# Patient Record
Sex: MALE | NOT HISPANIC OR LATINO | Employment: UNEMPLOYED | URBAN - METROPOLITAN AREA
[De-identification: names, ages, dates, MRNs, and addresses within clinical notes are randomized per-mention and may not be internally consistent; named-entity substitution may affect disease eponyms.]

---

## 2021-12-27 ENCOUNTER — OFFICE VISIT (OUTPATIENT)
Dept: INTERNAL MEDICINE | Facility: CLINIC | Age: 36
End: 2021-12-27
Payer: COMMERCIAL

## 2021-12-27 VITALS
WEIGHT: 286.2 LBS | OXYGEN SATURATION: 99 % | BODY MASS INDEX: 33.79 KG/M2 | HEIGHT: 77 IN | TEMPERATURE: 97.9 F | HEART RATE: 87 BPM | SYSTOLIC BLOOD PRESSURE: 124 MMHG | DIASTOLIC BLOOD PRESSURE: 76 MMHG

## 2021-12-27 DIAGNOSIS — F41.9 RECURRENT MODERATE MAJOR DEPRESSIVE DISORDER WITH ANXIETY (H): ICD-10-CM

## 2021-12-27 DIAGNOSIS — R00.2 PALPITATIONS: ICD-10-CM

## 2021-12-27 DIAGNOSIS — E78.5 HYPERLIPIDEMIA LDL GOAL <100: ICD-10-CM

## 2021-12-27 DIAGNOSIS — M72.2 PLANTAR FASCIITIS: ICD-10-CM

## 2021-12-27 DIAGNOSIS — G47.33 OSA (OBSTRUCTIVE SLEEP APNEA): ICD-10-CM

## 2021-12-27 DIAGNOSIS — Z00.00 ROUTINE GENERAL MEDICAL EXAMINATION AT A HEALTH CARE FACILITY: Primary | ICD-10-CM

## 2021-12-27 DIAGNOSIS — F33.1 RECURRENT MODERATE MAJOR DEPRESSIVE DISORDER WITH ANXIETY (H): ICD-10-CM

## 2021-12-27 DIAGNOSIS — Z71.85 VACCINE COUNSELING: ICD-10-CM

## 2021-12-27 LAB
ALBUMIN SERPL-MCNC: 4 G/DL (ref 3.4–5)
ALP SERPL-CCNC: 42 U/L (ref 40–150)
ALT SERPL W P-5'-P-CCNC: 108 U/L (ref 0–70)
ANION GAP SERPL CALCULATED.3IONS-SCNC: 7 MMOL/L (ref 3–14)
AST SERPL W P-5'-P-CCNC: 44 U/L (ref 0–45)
BILIRUB SERPL-MCNC: 0.3 MG/DL (ref 0.2–1.3)
BUN SERPL-MCNC: 14 MG/DL (ref 7–30)
CALCIUM SERPL-MCNC: 9 MG/DL (ref 8.5–10.1)
CHLORIDE BLD-SCNC: 108 MMOL/L (ref 94–109)
CHOLEST SERPL-MCNC: 195 MG/DL
CO2 SERPL-SCNC: 25 MMOL/L (ref 20–32)
CREAT SERPL-MCNC: 0.78 MG/DL (ref 0.66–1.25)
ERYTHROCYTE [DISTWIDTH] IN BLOOD BY AUTOMATED COUNT: 12.3 % (ref 10–15)
FASTING STATUS PATIENT QL REPORTED: YES
GFR SERPL CREATININE-BSD FRML MDRD: >90 ML/MIN/1.73M2
GLUCOSE BLD-MCNC: 101 MG/DL (ref 70–99)
HCT VFR BLD AUTO: 44.9 % (ref 40–53)
HDLC SERPL-MCNC: 39 MG/DL
HGB BLD-MCNC: 15.1 G/DL (ref 13.3–17.7)
LDLC SERPL CALC-MCNC: 128 MG/DL
MCH RBC QN AUTO: 30 PG (ref 26.5–33)
MCHC RBC AUTO-ENTMCNC: 33.6 G/DL (ref 31.5–36.5)
MCV RBC AUTO: 89 FL (ref 78–100)
NONHDLC SERPL-MCNC: 156 MG/DL
PLATELET # BLD AUTO: 228 10E3/UL (ref 150–450)
POTASSIUM BLD-SCNC: 4.2 MMOL/L (ref 3.4–5.3)
PROT SERPL-MCNC: 8 G/DL (ref 6.8–8.8)
RBC # BLD AUTO: 5.03 10E6/UL (ref 4.4–5.9)
SODIUM SERPL-SCNC: 140 MMOL/L (ref 133–144)
TRIGL SERPL-MCNC: 140 MG/DL
TSH SERPL DL<=0.005 MIU/L-ACNC: 1.68 MU/L (ref 0.4–4)
WBC # BLD AUTO: 5.9 10E3/UL (ref 4–11)

## 2021-12-27 PROCEDURE — 80053 COMPREHEN METABOLIC PANEL: CPT | Performed by: INTERNAL MEDICINE

## 2021-12-27 PROCEDURE — 96127 BRIEF EMOTIONAL/BEHAV ASSMT: CPT | Performed by: INTERNAL MEDICINE

## 2021-12-27 PROCEDURE — 99385 PREV VISIT NEW AGE 18-39: CPT | Performed by: INTERNAL MEDICINE

## 2021-12-27 PROCEDURE — 84443 ASSAY THYROID STIM HORMONE: CPT | Performed by: INTERNAL MEDICINE

## 2021-12-27 PROCEDURE — 85027 COMPLETE CBC AUTOMATED: CPT | Performed by: INTERNAL MEDICINE

## 2021-12-27 PROCEDURE — 80061 LIPID PANEL: CPT | Performed by: INTERNAL MEDICINE

## 2021-12-27 PROCEDURE — 99214 OFFICE O/P EST MOD 30 MIN: CPT | Mod: 25 | Performed by: INTERNAL MEDICINE

## 2021-12-27 PROCEDURE — 36415 COLL VENOUS BLD VENIPUNCTURE: CPT | Performed by: INTERNAL MEDICINE

## 2021-12-27 ASSESSMENT — ENCOUNTER SYMPTOMS
HEADACHES: 0
ARTHRALGIAS: 0
FREQUENCY: 0
PARESTHESIAS: 0
SORE THROAT: 0
FEVER: 0
NAUSEA: 0
CHILLS: 0
HEARTBURN: 0
NERVOUS/ANXIOUS: 1
WEAKNESS: 0
MYALGIAS: 0
DYSURIA: 0
SHORTNESS OF BREATH: 0
HEMATOCHEZIA: 0
CONSTIPATION: 0
EYE PAIN: 0
DIARRHEA: 0
COUGH: 0
HEMATURIA: 0
ABDOMINAL PAIN: 0
PALPITATIONS: 1
JOINT SWELLING: 0
DIZZINESS: 0

## 2021-12-27 ASSESSMENT — MIFFLIN-ST. JEOR: SCORE: 2345.57

## 2021-12-27 ASSESSMENT — PATIENT HEALTH QUESTIONNAIRE - PHQ9
10. IF YOU CHECKED OFF ANY PROBLEMS, HOW DIFFICULT HAVE THESE PROBLEMS MADE IT FOR YOU TO DO YOUR WORK, TAKE CARE OF THINGS AT HOME, OR GET ALONG WITH OTHER PEOPLE: VERY DIFFICULT
SUM OF ALL RESPONSES TO PHQ QUESTIONS 1-9: 17
SUM OF ALL RESPONSES TO PHQ QUESTIONS 1-9: 17

## 2021-12-27 NOTE — LETTER
[facility]  [address] [address]  Phone: [phone]   12/28/2021      Nathan Santiago  [address] [address]        Dear Mr. Nathan Santiago:    I am writing to inform you of the results of the laboratory tests you had done recently. Your blood counts and thyroid function are normal. Your comprehensive metabolic panel (which looks at your liver, kidneys, electrolytes, and glucose) is largely normal. Your ALT liver enzyme remains mildly elevated. Your cholesterol panel is elevated, but not to a level where I would recommend starting a medication to lower it at this point given your age and otherwise relative good health. I strongly recommend you obtain a COVID-19 vaccine as soon as possible.    Thank you for allowing me to participate in your care. If you have any further questions or problems, please contact me via our nurse line at [phone]. An even easier way to get ahold of myself or my team is through [facility], which you can sign up for at [email or web address]. [email or web address] is not only a great way to communicate with us, but also allows you to see your full results.      Sincerely,        Farhad Velázquez MD, MPH  Department of Internal Medicine  [facility]

## 2021-12-27 NOTE — PATIENT INSTRUCTIONS
- Our team will contact you via [employer] (if you sign up for it), telephone call (if results are urgent), or otherwise via letter in the mail with the results of today's lab tests once I have a chance to review them   - I recommend obtaining a COVID-19 vaccine as soon as possible

## 2021-12-27 NOTE — PROGRESS NOTES
SUBJECTIVE:   CC: Nathan Santiago is an 36 year old male who presents for preventative health visit.     Patient has been advised of split billing requirements and indicates understanding: Yes     Healthy Habits:     Getting at least 3 servings of Calcium per day:  Yes    Bi-annual eye exam:  Yes    Dental care twice a year:  NO    Sleep apnea or symptoms of sleep apnea:  Daytime drowsiness, Excessive snoring and Sleep apnea    Diet:  Regular (no restrictions)    Frequency of exercise:  4-5 days/week    Duration of exercise:  30-45 minutes    Taking medications regularly:  Not Applicable    Medication side effects:  Not applicable    PHQ-2 Total Score: 5    Additional concerns today:  Yes    Nathan presents today for a physical exam. This is the first time I have met Nathan. He has some chronic foot pain. He believes this could be plantar fasciitis. Happening a lot. Worst in the morning with first step. Pain at bottom of foot near his heel. Has not tried any insoles or NSAIDs. Believes he has sleep apnea and would like a referral to a sleep study. We also discussed his mood. He has noticed some anger and anxiety for the last few years. No SI, HI, or thoughts of self-harm. Some depression. Mostly anxiety. Reading a book about CBT with his wife. He's 'not a big pill person' but open to trying antidepressant.    Today's PHQ-2 Score:   PHQ-2 ( 1999 Pfizer) 12/27/2021   Q1: Little interest or pleasure in doing things 3   Q2: Feeling down, depressed or hopeless 2   PHQ-2 Score 5   Q1: Little interest or pleasure in doing things Nearly every day   Q2: Feeling down, depressed or hopeless More than half the days   PHQ-2 Score 5     Answers for HPI/ROS submitted by the patient on 12/27/2021  If you checked off any problems, how difficult have these problems made it for you to do your work, take care of things at home, or get along with other people?: Very difficult  PHQ9 TOTAL SCORE: 17    Abuse: Current or Past (Physical, Sexual or  "Emotional)- No  Do you feel safe in your environment? Yes    Social History     Tobacco Use     Smoking status: Former Smoker     Smokeless tobacco: Never Used   Substance Use Topics     Alcohol use: Not Currently     If you drink alcohol do you typically have >3 drinks per day or >7 drinks per week? No    Alcohol Use 12/27/2021   Prescreen: >3 drinks/day or >7 drinks/week? No   Prescreen: >3 drinks/day or >7 drinks/week? -     Reviewed orders with patient. Reviewed health maintenance and updated orders accordingly - Yes    Labs reviewed in EPIC    Reviewed and updated as needed this visit by clinical staff  Tobacco  Allergies  Meds  Problems  Med Hx  Surg Hx  Fam Hx  Soc Hx     Reviewed and updated as needed this visit by Provider  Tobacco  Allergies  Meds  Problems  Med Hx  Surg Hx  Fam Hx        Review of Systems   Constitutional: Negative for chills and fever.   HENT: Negative for congestion, ear pain, hearing loss and sore throat.    Eyes: Negative for pain and visual disturbance.   Respiratory: Negative for cough and shortness of breath.    Cardiovascular: Positive for palpitations. Negative for chest pain and peripheral edema.   Gastrointestinal: Negative for abdominal pain, constipation, diarrhea, heartburn, hematochezia and nausea.   Genitourinary: Negative for dysuria, frequency, genital sores, hematuria and urgency.   Musculoskeletal: Negative for arthralgias, joint swelling and myalgias.   Skin: Negative for rash.   Neurological: Negative for dizziness, weakness, headaches and paresthesias.   Psychiatric/Behavioral: Positive for mood changes. The patient is nervous/anxious.      OBJECTIVE:   /76   Pulse 87   Temp 97.9  F (36.6  C) (Tympanic)   Ht 1.956 m (6' 5\")   Wt 129.8 kg (286 lb 3.2 oz)   SpO2 99%   BMI 33.94 kg/m      Physical Exam  GENERAL: In no distress.  EYES: Conjunctivae/corneas clear. EOMs grossly intact. PERRL.  HENT: NC/AT, facies symmetric. Neck supple. No LAD or " thyromegaly noted.  RESP: CTAB. No w/r/r.  CV: RRR, no m/r/g.  GI: NT, ND, without rebound or guarding, no CVA tenderness, no hepatomegaly appreciated.  MSK: Moves all four extremities freely. Some TTP over plantar fascia areas bilaterally. No pain with 5th MTP palpation or foot squeeze bilaterally.  SKIN: No significant ulcers, lesions or rashes on the visualized portions of the skin  NEURO: Alert. Oriented.  PSYCH: Linear thought process. Speech normal rate and volume. No tangential thoughts, hallucinations, or delusions.    Diagnostic Test Results: Labs reviewed in [facility]    ASSESSMENT/PLAN:   Routine general medical examination at a health care facility  Reviewed PMH. Discussed healthcare maintenance issues, including cancer screenings (not due), relevant immunizations (declines COVID shot today), and cardiac risk factor screenings such as for cholesterol, HTN, and DM.    Recurrent moderate major depressive disorder with anxiety (H)  Interested in counseling. Also discussed starting selective serotonin reuptake inhibitor therapy, will start sertraline. Discussed side effects such as dry mouth, diarrhea, sexual dysfunction, and depersonalization. Discussed that these medications need to be taken every day to work and that they can take 4-6 weeks to fully kick in. F/u in 4-6 weeks for mood check.  - Adult Mental Health Referral; Future  - sertraline (ZOLOFT) 50 MG tablet; Take 0.5 tablets (25 mg) by mouth daily for 6 days, THEN 1 tablet (50 mg) daily.    ERNESTINE (obstructive sleep apnea)  High-risk STOP-BANG score. Referral placed.  - SLEEP EVALUATION & MANAGEMENT REFERRAL - ADULT -; Future    Plantar fasciitis  Describes classic history and TTP along plantar fascia on exam. Recommended good insoles, NSAIDs for breakthrough pain. Podiatry referral placed for use in future in case those two interventions do not help.  - Orthopedic  Referral; Future    Palpitations  Recent evaluation through OSH included EKG  "which was reportedly normal. DDX includes anxiety, though will screen for other possibilities with below blood work. Encouraged to monitor, discussed red flag cardiac symptoms. If persistent, could consider Holter/ZioPatch in future.  - Comprehensive metabolic panel; Future  - CBC with platelets; Future  - TSH with free T4 reflex; Future    Hyperlipidemia LDL goal <100  Fasting today.  - Lipid Profile; Future    Vaccine counseling  Unvaccinated against COVID-19. I offered the opportunity to ask any questions about the COVID-19 vaccines that he may have and he reported he does not have any questions.  I recommended he obtain one ASAP and he declined.    Patient has been advised of split billing requirements and indicates understanding: Yes     COUNSELING:   Special attention given to:        Regular exercise       Healthy diet/nutrition       Immunizations  Declined: COVID       Colon cancer screening       Prostate cancer screening    Estimated body mass index is 33.94 kg/m  as calculated from the following:    Height as of this encounter: 1.956 m (6' 5\").    Weight as of this encounter: 129.8 kg (286 lb 3.2 oz).     He reports that he has quit smoking. He has never used smokeless tobacco.    Farhad Velázquez MD  [facility]  "

## 2021-12-28 ASSESSMENT — PATIENT HEALTH QUESTIONNAIRE - PHQ9: SUM OF ALL RESPONSES TO PHQ QUESTIONS 1-9: 17

## 2022-02-06 ENCOUNTER — HEALTH MAINTENANCE LETTER (OUTPATIENT)
Age: 37
End: 2022-02-06

## 2022-02-22 ASSESSMENT — SLEEP AND FATIGUE QUESTIONNAIRES
HOW LIKELY ARE YOU TO NOD OFF OR FALL ASLEEP IN A CAR, WHILE STOPPED FOR A FEW MINUTES IN TRAFFIC: SLIGHT CHANCE OF DOZING
HOW LIKELY ARE YOU TO NOD OFF OR FALL ASLEEP WHILE SITTING INACTIVE IN A PUBLIC PLACE: MODERATE CHANCE OF DOZING
HOW LIKELY ARE YOU TO NOD OFF OR FALL ASLEEP WHILE LYING DOWN TO REST IN THE AFTERNOON WHEN CIRCUMSTANCES PERMIT: HIGH CHANCE OF DOZING
HOW LIKELY ARE YOU TO NOD OFF OR FALL ASLEEP WHILE SITTING QUIETLY AFTER LUNCH WITHOUT ALCOHOL: HIGH CHANCE OF DOZING
HOW LIKELY ARE YOU TO NOD OFF OR FALL ASLEEP WHILE SITTING AND TALKING TO SOMEONE: SLIGHT CHANCE OF DOZING
HOW LIKELY ARE YOU TO NOD OFF OR FALL ASLEEP WHILE WATCHING TV: MODERATE CHANCE OF DOZING
HOW LIKELY ARE YOU TO NOD OFF OR FALL ASLEEP WHILE SITTING AND READING: HIGH CHANCE OF DOZING
HOW LIKELY ARE YOU TO NOD OFF OR FALL ASLEEP WHEN YOU ARE A PASSENGER IN A CAR FOR AN HOUR WITHOUT A BREAK: MODERATE CHANCE OF DOZING

## 2022-02-28 ENCOUNTER — VIRTUAL VISIT (OUTPATIENT)
Dept: SLEEP MEDICINE | Facility: CLINIC | Age: 37
End: 2022-02-28
Attending: INTERNAL MEDICINE
Payer: COMMERCIAL

## 2022-02-28 VITALS — HEIGHT: 78 IN | WEIGHT: 270 LBS | BODY MASS INDEX: 31.24 KG/M2

## 2022-02-28 DIAGNOSIS — R06.83 SNORING: ICD-10-CM

## 2022-02-28 DIAGNOSIS — R40.0 SLEEPINESS: ICD-10-CM

## 2022-02-28 DIAGNOSIS — G47.30 OBSERVED SLEEP APNEA: Primary | ICD-10-CM

## 2022-02-28 PROCEDURE — 99244 OFF/OP CNSLTJ NEW/EST MOD 40: CPT | Mod: 95 | Performed by: PHYSICIAN ASSISTANT

## 2022-02-28 ASSESSMENT — PAIN SCALES - GENERAL: PAINLEVEL: MILD PAIN (2)

## 2022-02-28 NOTE — PROGRESS NOTES
Sleep Consultation:    Date on this visit: 2/28/2022    Nathan Santiago  is referred by Farhad Sofia for a sleep consultation regarding possible apnea.     Primary Physician: No Ref-Primary, Physician       Nathan Santiago reports having problems maintaining sleep for his entire life, observed apnea for as long as he can remember. His medical history is significant for depression, anxiety, BMI 33.     SLEEP HISTORY    Nathan goes to sleep at 9:30-10:00 PM during the week. He wakes up at 7:00-7:30 AM, without an alarm. He is often in and out of sleep after 5:30-6 AM. He falls asleep in 30 minutes.  Nathan sometimes has difficulty falling asleep but more trouble staying asleep.  He wakes up 2-5 times a night for 5 minutes to the rest of the night before falling back to sleep.  Nathan wakes up to go to the bathroom and from his own snoring.  On weekends, his sleep schedule is the same.  Patient gets an average of 6-8 hours of sleep per night.     Patient does sometimes use electronics in bed and read in bed and does not watch TV in bed and worry in bed about anything.     Nathan does not do shift work.  He works day shifts.  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      Nathan does snore frequently and snoring is loud. He has woken himself with his snoring. His bed partner generally sleeps through his snoring but has observed apnea. He has friend who are in the medical field who have observed apnea. He says his mother observed apnea when he was in high school and so skinny people thought he was on drugs. They were going to have him go through a sleep study then, but it did not happen. Patient sleeps on his back and side. He has occasional morning dry mouth and morning headaches, denies morning confusion and restless legs. Nathan denies any bruxism, sleep walking, sleep talking, dream enactment, sleep paralysis, cataplexy and hypnogogic/hypnopompic hallucinations.    Nathan has difficulty breathing  through his nose, depression and anxiety, denies reflux at night and heartburn.  He may have some claustrophobia with having something on his face.    Nathan has lost 15-20 pounds in the last few years. He is more active and has changed his diet, just to be healthier, not with the intent of weight loss.  Patient describes themself as a morning person.  He would prefer to go to sleep at 10:30 PM and wake up at 7:00 AM.  Patient's [place] Sleepiness score 17/24 consistent with excessive daytime sleepiness.      Nathan tries not to take naps because he wakes with headaches. He takes frequent inadvertant naps if he sits still for too long.  He denies dozing while driving. Patient was counseled on the importance of driving while alert, to pull over if drowsy, or nap before getting into the vehicle if sleepy.  He uses no caffeine.       Allergies:    Allergies   Allergen Reactions     Fentanyl Other (See Comments)     States bp and heart rate dropped.        Medications:    No current outpatient medications on file.       Problem List:  There are no problems to display for this patient.       Past Medical/Surgical History:  History reviewed. No pertinent past medical history.  Past Surgical History:   Procedure Laterality Date     ENT SURGERY      sinus surgery in childhood     ORTHOPEDIC SURGERY      collarbone     ORTHOPEDIC SURGERY      bone growth on foot       Social History:  Social History     Socioeconomic History     Marital status:      Spouse name: Not on file     Number of children: Not on file     Years of education: Not on file     Highest education level: Not on file   Occupational History     Not on file   Tobacco Use     Smoking status: Former Smoker     Quit date: 2018     Years since quittin.1     Smokeless tobacco: Never Used   Substance and Sexual Activity     Alcohol use: Not Currently     Drug use: Not Currently     Sexual activity: Yes     Partners: Female   Other Topics Concern      "Parent/sibling w/ CABG, MI or angioplasty before 65F 55M? Not Asked   Social History Narrative     Not on file     Social Determinants of Health     Financial Resource Strain: Not on file   Food Insecurity: Not on file   Transportation Needs: Not on file   Physical Activity: Not on file   Stress: Not on file   Social Connections: Not on file   Intimate Partner Violence: Not on file   Housing Stability: Not on file       Family History:  History reviewed. No pertinent family history.    Review of Systems:  A complete review of systems reviewed by me is negative with the exeption of what has been mentioned in the history of present illness.  CONSTITUTIONAL: NEGATIVE for weight gain/loss, fever, chills, sweats or night sweats, drug allergies.  EYES: NEGATIVE for changes in vision, blind spots, double vision.  ENT: NEGATIVE for ear pain, sore throat, sinus pain, post-nasal drip, runny nose, bloody nose  CARDIAC: NEGATIVE for fast heartbeats, chest pain or pressure, breathlessness when lying flat, swollen legs or swollen feet.  CARDIAC:  POSITIVE for  fluttering in chest  NEUROLOGIC: NEGATIVE  weakness or numbness in the arms or legs.  NEUROLOGIC:  POSITIVE for  headaches  PULMONARY: NEGATIVE SOB at rest, SOB with activity, dry cough, productive cough, coughing up blood, wheezing or whistling when breathing.    GASTROINTESTINAL: NEGATIVE for nausea or vomitting, loose or watery stools, fat or grease in stools, constipation, abdominal pain, bowel movements black in color or blood noted.  GENITOURINARY: NEGATIVE for pain during urination, blood in urine, urinating more frequently than usual, irregular menstrual periods.  MUSCULOSKELETAL: NEGATIVE for muscle pain, bone or joint pain, swollen joints.    Physical Examination:  Vitals: Ht 1.981 m (6' 6\")   Wt 122.5 kg (270 lb)   BMI 31.20 kg/m      GENERAL APPEARANCE: healthy, alert, no distress and cooperative  EYES: Eyes grossly normal to inspection  HENT: oral mucous " membranes moist and oropharynx clear  NECK: no asymmetry, masses, or scars  RESP: no respiratory distress, cough or wheeze  Mallampati Class: II.  Tonsillar Stage: 1  hidden by pillars.    Impression/Plan:    (G47.30) Observed sleep apnea  (primary encounter diagnosis), (R06.83) Snoring, (R40.0) Sleepiness  Comment: Nathan presents with concerns for sleep apnea. He has had numerous people observe snoring and pauses in his breathing going back to high school. He notes waking himself with his snoring and with a headache. He will doze regardless of where he is if he is sitting quietly for too long (although denies drowsy driving).  STOP BANG TOTAL: 4 snoring, sleepiness (17/24), observed apnea, male gender. Negative risk factors include; no HTN, BMI <35 (31), age <50 (36). He has frequent sleep fragmentation, probably from apnea, but he also spends 9+ hours in bed at night.  Plan: HST-Home Sleep Apnea Test   We discussed that he may need to compress his time in bed if still having trouble sleeping once we treat any apnea he may have. We will revisit this as needed at future visits.             Literature provided regarding sleep apnea.      He will follow up with me in approximately two weeks after his sleep study has been competed to review the results and discuss plan of care.       Polysomnography reviewed.  Obstructive sleep apnea reviewed.  Complications of untreated sleep apnea were reviewed.    38 minutes were spent on the date of the encounter doing chart review, history and exam, documentation and further activities as noted above.  Bennett Goltz, PA-C       CC: Farhad Sofia

## 2022-02-28 NOTE — PROGRESS NOTES
Nathan is a 36 year old who is being evaluated via a billable video visit.      Pt reviewed allergies and medications on mychart    How would you like to obtain your AVS? MyChart  If the video visit is dropped, the invitation should be resent by: Text to cell phone: [phone]  Will anyone else be joining your video visit? Sydney Bermudez    Video Start Time: 12:32 PM  Video-Visit Details    Type of service:  Video Visit    Video End Time:1:05 PM    Originating Location (pt. Location): Home    Distant Location (provider location):  [facility]     Platform used for Video Visit: [employer]

## 2022-02-28 NOTE — PATIENT INSTRUCTIONS
"      MY TREATMENT INFORMATION FOR SLEEP APNEA-  Nathan Santiago    DOCTOR : Bennett Goltz, PA-C    Am I having a sleep study at a sleep center?  --->Due to normal delays, you will be contacted within 2-4 weeks to schedule    Am I having a home sleep study?  --->Watch the video for the device you are using:    -/drop off device-   https://www.[employer].com/watch?v=yGGFBdELGhk    Frequently asked questions:  1. What is Obstructive Sleep Apnea (ERNESTINE)? ERNESTINE is the most common type of sleep apnea. Apnea means, \"without breath.\"  Apnea is most often caused by narrowing or collapse of the upper airway as muscles relax during sleep.   Almost everyone has occasional apneas. Most people with sleep apnea have had brief interruptions at night frequently for many years.  The severity of sleep apnea is related to how frequent and severe the events are.   2. What are the consequences of ERNESTINE? Symptoms include: feeling sleepy during the day, snoring loudly, gasping or stopping of breathing, trouble sleeping, and occasionally morning headaches or heartburn at night.  Sleepiness can be serious and even increase the risk of falling asleep while driving. Other health consequences may include development of high blood pressure and other cardiovascular disease in persons who are susceptible. Untreated ERNESTINE  can contribute to heart disease, stroke and diabetes.   3. What are the treatment options? In most situations, sleep apnea is a lifelong disease that must be managed with daily therapy. Medications are not effective for sleep apnea and surgery is generally not considered until other therapies have been tried. Your treatment is your choice . Continuous Positive Airway (CPAP) works right away and is the therapy that is effective in nearly everyone. An oral device to hold your jaw forward is usually the next most reliable option. Other options include postioning devices (to keep you off your back), weight loss, and surgery including a " tongue pacing device. There is more detail about some of these options below.  4. Are my sleep studies covered by insurance? Although we will request verification of coverage, we advise you also check in advance of the study to ensure there is coverage.    Important tips for those choosing CPAP and similar devices   Know your equipment:  CPAP is continuous positive airway pressure that prevents obstructive sleep apnea by keeping the throat from collapsing while you are sleeping. In most cases, the device is  smart  and can slowly self-adjusts if your throat collapses and keeps a record every day of how well you are treated-this information is available to you and your care team.  BPAP is bilevel positive airway pressure that keeps your throat open and also assists each breath with a pressure boost to maintain adequate breathing.  Special kinds of BPAP are used in patients who have inadequate breathing from lung or heart disease. In most cases, the device is  smart  and can slowly self-adjusts to assist breathing. Like CPAP, the device keeps a record of how well you are treated.  Your mask is your connection to the device. You get to choose what feels most comfortable and the staff will help to make sure if fits. Here: are some examples of the different masks that are available:       Key points to remember on your journey with sleep apnea:  1. Sleep study.  PAP devices often need to be adjusted during a sleep study to show that they are effective and adjusted right.  2. Good tips to remember: Try wearing just the mask during a quiet time during the day so your body adapts to wearing it. A humidifier is recommended for comfort in most cases to prevent drying of your nose and throat. Allergy medication from your provider may help you if you are having nasal congestion.  3. Getting settled-in. It takes more than one night for most of us to get used to wearing a mask. Try wearing just the mask during a quiet time during  the day so your body adapts to wearing it. A humidifier is recommended for comfort in most cases. Our team will work with you carefully on the first day and will be in contact within 4 days and again at 2 and 4 weeks for advice and remote device adjustments. Your therapy is evaluated by the device each day.   4. Use it every night. The more you are able to sleep naturally for 7-8 hours, the more likely you will have good sleep and to prevent health risks or symptoms from sleep apnea. Even if you use it 4 hours it helps. Occasionally all of us are unable to use a medical therapy, in sleep apnea, it is not dangerous to miss one night.   5. Communicate. Call our skilled team on the number provided on the first day if your visit for problems that make it difficult to wear the device. Over 2 out of 3 patients can learn to wear the device long-term with help from our team. Remember to call our team or your sleep providers if you are unable to wear the device as we may have other solutions for those who cannot adapt to mask CPAP therapy. It is recommended that you sleep your sleep provider within the first 3 months and yearly after that if you are not having problems.   6. Use it for your health. We encourage use of CPAP masks during daytime quiet periods to allow your face and brain to adapt to the sensation of CPAP so that it will be a more natural sensation to awaken to at night or during naps. This can be very useful during the first few weeks or months of adapting to CPAP though it does not help medically to wear CPAP during wakefulness and  should not be used as a strategy just to meet guidelines.  7. Take care of your equipment. Make sure you clean your mask and tubing using directions every day and that your filter and mask are replaced as recommended or if they are not working.     BESIDES CPAP, WHAT OTHER THERAPIES ARE THERE?    Positioning Device  Positioning devices are generally used when sleep apnea is mild  and only occurs on your back.This example shows a pillow that straps around the waist. It may be appropriate for those whose sleep study shows milder sleep apnea that occurs primarily when lying flat on one's back. Preliminary studies have shown benefit but effectiveness at home may need to be verified by a home sleep test. These devices are generally not covered by medical insurance.  Examples of devices that maintain sleeping on the back to prevent snoring and mild sleep apnea.    Belt type body positioner  http://[employer]/    Electronic reminder  http://nightshifttherapy.com/  http://www.[address].[employer].au/      Oral Appliance  What is oral appliance therapy?  An oral appliance device fits on your teeth at night like a retainer used after having braces. The device is made by a specialized dentist and requires several visits over 1-2 months before a manufactured device is made to fit your teeth and is adjusted to prevent your sleep apnea. Once an oral device is working properly, snoring should be improved. A home sleep test may be recommended at that time if to determine whether the sleep apnea is adequately treated.       Some things to remember:  -Oral devices are often, but not always, covered by your medical insurance. Be sure to check with your insurance provider.   -If you are referred for oral therapy, you will be given a list of specialized dentists to consider or you may choose to visit the Web site of the American Academy of Dental Sleep Medicine  -Oral devices are less likely to work if you have severe sleep apnea or are extremely overweight.     More detailed information  An oral appliance is a small acrylic device that fits over the upper and lower teeth  (similar to a retainer or a mouth guard). This device slightly moves jaw forward, which moves the base of the tongue forward, opens the airway, improves breathing for effective treat snoring and obstructive sleep apnea in perhaps 7 out of 10 people .   The best working devices are custom-made by a dental device  after a mold is made of the teeth 1, 2, 3.  When is an oral appliance indicated?  Oral appliance therapy is recommended as a first-line treatment for patients with primary snoring, mild sleep apnea, and for patients with moderate sleep apnea who prefer appliance therapy to use of CPAP4, 5. Severity of sleep apnea is determined by sleep testing and is based on the number of respiratory events per hour of sleep.   How successful is oral appliance therapy?  The success rate of oral appliance therapy in patients with mild sleep apnea is 75-80% while in patients with moderate sleep apnea it is 50-70%. The chance of success in patients with severe sleep apnea is 40-50%. The research also shows that oral appliances have a beneficial effect on the cardiovascular health of ERNESTINE patients at the same magnitude as CPAP therapy7.  Oral appliances should be a second-line treatment in cases of severe sleep apnea, but if not completely successful then a combination therapy utilizing CPAP plus oral appliance therapy may be effective. Oral appliances tend to be effective in a broad range of patients although studies show that the patients who have the highest success are females, younger patients, those with milder disease, and less severe obesity. 3, 6.   Finding a dentist that practices dental sleep medicine  Specific training is available through the American Academy of Dental Sleep Medicine for dentists interested in working in the field of sleep. To find a dentist who is educated in the field of sleep and the use of oral appliances, near you, visit the Web site of the American Academy of Dental Sleep Medicine.    References  1. Tatum et al. Objectively measured vs self-reported compliance during oral appliance therapy for sleep-disordered breathing. Chest 2013; 144(5): 1284-4427.  2. Vinod et al. Objective measurement of compliance during oral  appliance therapy for sleep-disordered breathing. Thorax 2013; 68(1): 91-96.  3. Cinthia, et al. Mandibular advancement devices in 620 men and women with ERNESTINE and snoring: tolerability and predictors of treatment success. Chest 2004; 125: 0467-2026.  4. Hoang et al. Oral appliances for snoring and ERNESTINE: a review. Sleep 2006; 29: 244-262.  5. Nicole et al. Oral appliance treatment for ERNESTINE: an update. J Clin Sleep Med 2014; 10(2): 215-227.  6. Tevin et al. Predictors of OSAH treatment outcome. J Dent Res 2007; 86: 5429-0706.    Weight Loss:    Weight loss is a long-term strategy that may improve sleep apnea in some patients.    Weight management is a personal decision and the decision should be based on your interest and the potential benefits.  If you are interested in exploring weight loss strategies, the following discussion covers the impact on weight loss on sleep apnea and the approaches that may be successful.    Being overweight does not necessarily mean you will have health consequences.  Those who have BMI over 35 or over 27 with existing medical conditions carries greater risk.   Weight loss decreases severity of sleep apnea in most people with obesity. For those with mild obesity who have developed snoring with weight gain, even 15-30 pound weight loss can improve and occasionally eliminate sleep apnea.  Structured and life-long dietary and health habits are necessary to lose weight and keep healthier weight levels.     Though there may be significant health benefits from weight loss, long-term weight loss is very difficult to achieve- studies show success with dietary management in less than 10% of people. In addition, substantial weight loss may require years of dietary control and may be difficult if patients have severe obesity. In these cases, surgical management may be considered.  Finally, older individuals who have tolerated obesity without health complications may be less likely to  benefit from weight loss strategies.      Your BMI is Body mass index is 31.2 kg/m .  Weight management is a personal decision.  If you are interested in exploring weight loss strategies, the following discussion covers the approaches that may be successful. Body mass index (BMI) is one way to tell whether you are at a healthy weight, overweight, or obese. It measures your weight in relation to your height.  A BMI of 18.5 to 24.9 is in the healthy range. A person with a BMI of 25 to 29.9 is considered overweight, and someone with a BMI of 30 or greater is considered obese. More than two-thirds of American adults are considered overweight or obese.  Being overweight or obese increases the risk for further weight gain. Excess weight may lead to heart disease and diabetes.  Creating and following plans for healthy eating and physical activity may help you improve your health.  Weight control is part of healthy lifestyle and includes exercise, emotional health, and healthy eating habits. Careful eating habits lifelong are the mainstay of weight control. Though there are significant health benefits from weight loss, long-term weight loss with diet alone may be very difficult to achieve- studies show long-term success with dietary management in less than 10% of people. Attaining a healthy weight may be especially difficult to achieve in those with severe obesity. In some cases, medications, devices and surgical management might be considered.  What can you do?  If you are overweight or obese and are interested in methods for weight loss, you should discuss this with your provider.     Consider reducing daily calorie intake by 500 calories.     Keep a food journal.     Avoiding skipping meals, consider cutting portions instead.    Diet combined with exercise helps maintain muscle while optimizing fat loss. Strength training is particularly important for building and maintaining muscle mass. Exercise helps reduce stress,  increase energy, and improves fitness. Increasing exercise without diet control, however, may not burn enough calories to loose weight.       Start walking three days a week 10-20 minutes at a time    Work towards walking thirty minutes five days a week     Eventually, increase the speed of your walking for 1-2 minutes at time    And look into health and wellness programs that may be available through your health insurance provider, employer, local community center, or joon club.    Weight management plan: continue with current diet and activity improvements    Surgery:    Surgery for obstructive sleep apnea is considered generally only when other therapies fail to work. Surgery may be discussed with you if you are having a difficult time tolerating CPAP and or when there is an abnormal structure that requires surgical correction.  Nose and throat surgeries often enlarge the airway to prevent collapse.  Most of these surgeries create pain for 1-2 weeks and up to half of the most common surgeries are not effective throughout life.  You should carefully discuss the benefits and drawbacks to surgery with your sleep provider and surgeon to determine if it is the best solution for you.   More information  Surgery for ERNESTINE is directed at areas that are responsible for narrowing or complete obstruction of the airway during sleep.  There are a wide range of procedures available to enlarge and/or stabilize the airway to prevent blockage of breathing in the three major areas where it can occur: the palate, tongue, and nasal regions.  Successful surgical treatment depends on the accurate identification of the factors responsible for obstructive sleep apnea in each person.  A personalized approach is required because there is no single treatment that works well for everyone.  Because of anatomic variation, consultation with an examination by a sleep surgeon is a critical first step in determining what surgical options are best  for each patient.  In some cases, examination during sedation may be recommended in order to guide the selection of procedures.  Patients will be counseled about risks and benefits as well as the typical recovery course after surgery. Surgery is typically not a cure for a person s ERNESTINE.  However, surgery will often significantly improve one s ERNESTINE severity (termed  success rate ).  Even in the absence of a cure, surgery will decrease the cardiovascular risk associated with OSA7; improve overall quality of life8 (sleepiness, functionality, sleep quality, etc).  Palate Procedures:  Patients with ERNESTINE often have narrowing of their airway in the region of their tonsils and uvula.  The goals of palate procedures are to widen the airway in this region as well as to help the tissues resist collapse.  Modern palate procedure techniques focus on tissue conservation and soft tissue rearrangement, rather than tissue removal.  Often the uvula is preserved in this procedure. Residual sleep apnea is common in patient after pharyngoplasty with an average reduction in sleep apnea events of 33%2.    Tongue Procedures:  ExamWhile patients are awake, the muscles that surround the throat are active and keep this region open for breathing. These muscles relax during sleep, allowing the tongue and other structures to collapse and block breathing.  There are several different tongue procedures available.  Selection of a tongue base procedure depends on characteristics seen on physical exam.  Generally, procedures are aimed at removing bulky tissues in this area or preventing the back of the tongue from falling back during sleep.  Success rates for tongue surgery range from 50-62%3.  Hypoglossal Nerve Stimulation:  Hypoglossal nerve stimulation has recently received approval from the [place] Food and Drug Administration for the treatment of obstructive sleep apnea.  This is based on research showing that the system was safe and  effective in treating sleep apnea6.  Results showed that the median AHI score decreased 68%, from 29.3 to 9.0. This therapy uses an implant system that senses breathing patterns and delivers mild stimulation to airway muscles, which keeps the airway open during sleep.  The system consists of three fully implanted components: a small generator (similar in size to a pacemaker), a breathing sensor, and a stimulation lead.  Using a small handheld remote, a patient turns the therapy on before bed and off upon awakening.    Candidates for this device must be greater than 22 years of age, have moderate to severe ERNESTINE (AHI between 20-65), BMI less than 32, have tried CPAP/oral appliance without success, and have appropriate upper airway anatomy (determined by a sleep endoscopy performed by Dr. Sierra).  Hypoglossal Nerve Stimulation Pathway:    The sleep surgeon s office will work with the patient through the insurance prior-authorization process (including communications and appeals).    Nasal Procedures:  Nasal obstruction can interfere with nasal breathing during the day and night.  Studies have shown that relief of nasal obstruction can improve the ability of some patients to tolerate positive airway pressure therapy for obstructive sleep apnea1.  Treatment options include medications such as nasal saline, topical corticosteroid and antihistamine sprays, and oral medications such as antihistamines or decongestants. Non-surgical treatments can include external nasal dilators for selected patients. If these are not successful by themselves, surgery can improve the nasal airway either alone or in combination with these other options.  Combination Procedures:  Combination of surgical procedures and other treatments may be recommended, particularly if patients have more than one area of narrowing or persistent positional disease.  The success rate of combination surgery ranges from 66-80%2,3.    References  1. Kanchan LITTLE The  Role of the Nose in Snoring and Obstructive Sleep Apnoea: An Update.  Eur Arch Otorhinolaryngol. 2011; 268: 1365-73.  2.  Bibi SM; Yue JA; Specner JR; Pallanch JF; Mily MB; Sheeba SG; Rom LOPEZ. Surgical modifications of the upper airway for obstructive sleep apnea in adults: a systematic review and meta-analysis. SLEEP 2010;33(10):8696-9924. Estiven SHANNON. Hypopharyngeal surgery in obstructive sleep apnea: an evidence-based medicine review.  Arch Otolaryngol Head Neck Surg. 2006 Feb;132(2):206-13.  3. Claudio YH1, Tanvir Y, Nir LIDIA. The efficacy of anatomically based multilevel surgery for obstructive sleep apnea. Otolaryngol Head Neck Surg. 2003 Oct;129(4):327-35.  4. Estiven SHANNON, Goldberg A. Hypopharyngeal Surgery in Obstructive Sleep Apnea: An Evidence-Based Medicine Review. Arch Otolaryngol Head Neck Surg. 2006 Feb;132(2):206-13.  5. Tulio FINNEGAN et al. Upper-Airway Stimulation for Obstructive Sleep Apnea.  N Engl J Med. 2014 Jan 9;370(2):139-49.  6. Rafita Y et al. Increased Incidence of Cardiovascular Disease in Middle-aged Men with Obstructive Sleep Apnea. Am J Respir Crit Care Med; 2002 166: 159-165  7. Dash EM et al. Studying Life Effects and Effectiveness of Palatopharyngoplasty (SLEEP) study: Subjective Outcomes of Isolated Uvulopalatopharyngoplasty. Otolaryngol Head Neck Surg. 2011; 144: 623-631.        WHAT IF I ONLY HAVE SNORING?      Mandibular advancement devices, lateral sleep positioning, long-term weight loss and treatment of nasal allergies have been shown to improve snoring.    Exercising tongue muscles with a game (https://www.ncbi.nlm.nih.gov/pubmed/40121975) or stimulating the tongue during the day with a device (https://doi.org/10.3390/tyy10848595) have improved snoring in some individuals.    Remember to Drive Safe... Drive Alive     Sleep health profoundly affects your health, mood, and your safety.  Thirty three percent of the population (one in three of us) is not getting enough sleep  and many have a sleep disorder. Not getting enough sleep or having an untreated / undertreated sleep condition may make us sleepy without even knowing it. In fact, our driving could be dramatically impaired due to our sleep health. As your provider, here are some things I would like you to know about driving:     Here are some warning signs for impairment and dangerous drowsy driving:              -Having been awake more than 16 hours               -Looking tired               -Eyelid drooping              -Head nodding (it could be too late at this point)              -Driving for more than 30 minutes     Some things you could do to make the driving safer if you are experiencing some drowsiness:              -Stop driving and rest              -Call for transportation              -Make sure your sleep disorder is adequately treated     Some things that have been shown NOT to work when experiencing drowsiness while driving:              -Turning on the radio              -Opening windows              -Eating any  distracting  /  entertaining  foods (e.g., sunflower seeds, candy, or any other)              -Talking on the phone      Your decision may not only impact your life, but also the life of others. Please, remember to drive safe for yourself and all of us.

## 2022-03-01 ENCOUNTER — TELEPHONE (OUTPATIENT)
Dept: SLEEP MEDICINE | Facility: CLINIC | Age: 37
End: 2022-03-01
Payer: COMMERCIAL

## 2022-03-01 NOTE — TELEPHONE ENCOUNTER
Reason for Call:  Other appointment    Detailed comments: PT IS DOING HST 3/9, NEEDS F/U WITH B GOLTZ AROUND 3/23, NOTHING AVAIL    Phone Number Patient can be reached at: Home number on file [phone] (home)    Best Time: ANYTIME    Can we leave a detailed message on this number? YES    Call taken on 3/1/2022 at 2:14 PM by Dorota Saini

## 2022-03-09 ENCOUNTER — OFFICE VISIT (OUTPATIENT)
Dept: SLEEP MEDICINE | Facility: CLINIC | Age: 37
End: 2022-03-09
Payer: COMMERCIAL

## 2022-03-09 DIAGNOSIS — R40.0 SLEEPINESS: ICD-10-CM

## 2022-03-09 DIAGNOSIS — G47.30 OBSERVED SLEEP APNEA: ICD-10-CM

## 2022-03-09 DIAGNOSIS — G47.33 OSA (OBSTRUCTIVE SLEEP APNEA): ICD-10-CM

## 2022-03-09 DIAGNOSIS — R06.83 SNORING: ICD-10-CM

## 2022-03-09 PROCEDURE — G0399 HOME SLEEP TEST/TYPE 3 PORTA: HCPCS | Performed by: INTERNAL MEDICINE

## 2022-03-10 ENCOUNTER — DOCUMENTATION ONLY (OUTPATIENT)
Dept: SLEEP MEDICINE | Facility: CLINIC | Age: 37
End: 2022-03-10
Payer: COMMERCIAL

## 2022-03-10 PROBLEM — G47.33 OSA (OBSTRUCTIVE SLEEP APNEA): Status: ACTIVE | Noted: 2022-03-10

## 2022-03-10 NOTE — PROGRESS NOTES
This HSAT was performed using a Noxturnal T3 device which recorded snore, sound, movement activity, body position, nasal pressure, oronasal thermal airflow, pulse, oximetry and both chest and abdominal respiratory effort. HSAT data was restricted to the time patient states they were in bed.     HSAT was scored using 1B 4% hypopnea rule.     HST AHI (Non-PAT): 42.3  Snoring was reported as loud.  Time with SpO2 below 89% was 5.2 minutes.   Overall signal quality was good     Pt will follow up with sleep provider to determine appropriate therapy.     HST POST-STUDY QUESTIONNAIRE    1. What time did you go to bed?  10 p.m.  2. How long do you think it took to fall asleep?  2 -  hrs  3. What time did you wake up to start the day?  7:15 a.m.  4. Did you get up during the night at all?  Yes - once  5. If you woke up, do you remember approximately what time(s)? I was awake off and on until about 1 a.m.  6. Did you have any difficulty with the equipment?  No  7. Did you us any type of treatment with this study?  None  8. Was the head of the bed elevated? No  9. Did you sleep in a recliner?  No  10. Did you stop using CPAP at least 3 days before this test?  NA  11. Any other information you'd like us to know? No

## 2022-03-10 NOTE — PROCEDURES
"HOME SLEEP STUDY INTERPRETATION    Patient: Nathan Santiago  MRN: [id number]  YOB: 1985  Study Date: 3/9/2022  PCP/Referring Provider: No Ref-Primary, Physician;   Ordering Provider: Bennett Goltz, PA-C     Indications for Home Study: Nathan Santiago is a 36 year old male who presents with symptoms suggestive of obstructive sleep apnea.    Estimated body mass index is 31.2 kg/m  as calculated from the following:    Height as of 22: 1.981 m (6' 6\").    Weight as of 22: 122.5 kg (270 lb).  Total score - [place]: 17 (2022 10:27 AM)  STOP-BAN/8    Data: A full night home sleep study was performed recording the standard physiologic parameters including body position, movement, sound, nasal pressure, thermal oral airflow, chest and abdominal movements with respiratory inductance plethysmography, and oxygen saturation by pulse oximetry. Pulse rate was estimated by oximetry recording. This study was considered adequate based on > 4 hours of quality oximetry and respiratory recording. As specified by the AASM Manual for the Scoring of Sleep and Associated events, version 2.3, Rule VIII.D 1B, 4% oxygen desaturation scoring for hypopneas is used as a standard of care on all home sleep apnea testing.    Analysis Time:  530 minutes    Respiration:   Sleep Associated Hypoxemia: sustained hypoxemia was not present. Baseline oxygen saturation was 95%.  Time with saturation less than or equal to 88% was 5.2 minutes. The lowest oxygen saturation was 85%.   Snoring: Snoring was present.  Respiratory events: The home study revealed a presence of 239 obstructive apneas and 50 mixed and central apneas. There were 83 hypopneas resulting in a combined apnea/hypopnea index [AHI] of 42.3 events per hour.  AHI was 59 per hour supine, 0 per hour prone, 44.5 per hour on left side, and 27 per hour on right side.   Pattern: Excluding events noted above, respiratory rate and pattern was Normal.    Position: Percent of " time spent: supine - 42.8%, prone - 1.7%, on left - 11.2%, on right - 44%.    Heart Rate: By pulse oximetry normal rate was noted.     Assessment:   Severe obstructive sleep apnea.  Sleep associated hypoxemia was not present.    Recommendations:  CPAP therapy is recommended for severe obstructive sleep apnea. Consider auto-CPAP at 5-15 cmH2O. Patient should have clinical follow up to monitor treatment and review download data from CPAP device.   Suggest optimizing sleep hygiene and avoiding sleep deprivation.  Weight management.    Diagnosis Code(s): Obstructive Sleep Apnea G47.33    Jeronimo Hogan MD, March 10, 2022   Diplomate, American Board of Psychiatry and Neurology, Sleep Medicine

## 2022-05-11 ENCOUNTER — OFFICE VISIT (OUTPATIENT)
Dept: SLEEP MEDICINE | Facility: CLINIC | Age: 37
End: 2022-05-11
Payer: COMMERCIAL

## 2022-05-11 VITALS
SYSTOLIC BLOOD PRESSURE: 142 MMHG | HEART RATE: 78 BPM | BODY MASS INDEX: 33.07 KG/M2 | DIASTOLIC BLOOD PRESSURE: 80 MMHG | OXYGEN SATURATION: 98 % | HEIGHT: 78 IN | WEIGHT: 285.8 LBS

## 2022-05-11 DIAGNOSIS — G47.33 OSA (OBSTRUCTIVE SLEEP APNEA): Primary | ICD-10-CM

## 2022-05-11 PROCEDURE — 99213 OFFICE O/P EST LOW 20 MIN: CPT | Performed by: INTERNAL MEDICINE

## 2022-05-11 NOTE — NURSING NOTE
"Chief Complaint   Patient presents with     Study Results       Initial BP (!) 142/80   Pulse 78   Ht 1.981 m (6' 6\")   Wt 129.6 kg (285 lb 12.8 oz)   SpO2 98%   BMI 33.03 kg/m   Estimated body mass index is 33.03 kg/m  as calculated from the following:    Height as of this encounter: 1.981 m (6' 6\").    Weight as of this encounter: 129.6 kg (285 lb 12.8 oz).    Medication Reconciliation: complete   ESS:19    Mario Samuels CNA    "

## 2022-05-11 NOTE — PROGRESS NOTES
Sleep Study Follow-Up Visit:    Date on this visit: 5/11/2022    Nathan Santiago comes in today for follow-up of his home sleep study done on 3/9/2022 at the [facility] for possible sleep apnea.    Analysis Time:  530 minutes     Respiration:   Sleep Associated Hypoxemia: sustained hypoxemia was not present. Baseline oxygen saturation was 95%.  Time with saturation less than or equal to 88% was 5.2 minutes. The lowest oxygen saturation was 85%.   Snoring: Snoring was present.  Respiratory events: The home study revealed a presence of 239 obstructive apneas and 50 mixed and central apneas. There were 83 hypopneas resulting in a combined apnea/hypopnea index [AHI] of 42.3 events per hour.  AHI was 59 per hour supine, 0 per hour prone, 44.5 per hour on left side, and 27 per hour on right side.   Pattern: Excluding events noted above, respiratory rate and pattern was Normal.     Position: Percent of time spent: supine - 42.8%, prone - 1.7%, on left - 11.2%, on right - 44%.     Heart Rate: By pulse oximetry normal rate was noted.      Assessment:   Severe obstructive sleep apnea.  Sleep associated hypoxemia was not present.    These findings were reviewed with patient.     Past medical/surgical history, family history, social history, medications and allergies were reviewed.      Problem List:  Patient Active Problem List    Diagnosis Date Noted     ERNESTINE (obstructive sleep apnea) 03/10/2022     Priority: Medium        Impression/Plan:    1.  Severe obstructive sleep apnea    Home sleep test result was reviewed in detail.  We discussed severe obstructive sleep apnea, consequences of untreated disease and management.  CPAP therapy is recommended for severe disease and patient opts to start CPAP therapy.    Plan:     1. Start auto PAP therapy 5-15 cm H2O  2.  Follow-up in 1 to 2 months after starting CPAP    I spent a total of 20 minutes for this appointment on this date of service which include time spent  before, during and after the visit for chart review, patient care, counseling and coordination of care.        Dr. Jeronimo Hogan     CC: No Ref-Primary, Physician

## 2022-05-18 ENCOUNTER — TELEPHONE (OUTPATIENT)
Dept: SLEEP MEDICINE | Facility: CLINIC | Age: 37
End: 2022-05-18
Payer: COMMERCIAL

## 2022-05-18 DIAGNOSIS — G47.33 OSA (OBSTRUCTIVE SLEEP APNEA): Primary | ICD-10-CM

## 2022-05-18 NOTE — TELEPHONE ENCOUNTER
Called patient to see if he would like to schedule his CPAP Setup with [facility]. Patient scheduled his CPAP Setup appointment with [facility]. Appointment is scheduled for 05/31/2022 at 10:30am with Ely in [place]. Gave patient address and location information, notified him of appointment expectations, screened patient for COVID 19 (passed) and asked patient to wear a mask in our facilities. Gave patient the phone number for the [facility] and notified patient of our customer service line and hours (M-F, 8am-4:30pm). Asked patient to call [facility] Customer Service to reschedule appointment if anything changes with his COVID screening questions.     Appointment Type: New CPAP Setup  Appointment Date: 05/31/2022  Appointment Time: 10:30am  Location: [facility]  Practitioner: Ely FERNANDES [facility]  Home [facility] Equipment  [facility]  [place] [facility]  [address]   [facility] Office: [phone] (Select Show[facility] Option)    [facility]  [facility] Equipment  [address]  [address]  [place] [address]  Office: [phone]  Fax: [phone]

## 2022-05-31 ENCOUNTER — DOCUMENTATION ONLY (OUTPATIENT)
Dept: SLEEP MEDICINE | Facility: CLINIC | Age: 37
End: 2022-05-31
Payer: COMMERCIAL

## 2022-05-31 DIAGNOSIS — G47.33 OSA (OBSTRUCTIVE SLEEP APNEA): Primary | ICD-10-CM

## 2022-05-31 NOTE — PROGRESS NOTES
Patient was offered choice of vendor and chose [facility].  Patient Nathan Santiago was set up at [facility]  on May 31, 2022. Patient received a Resmed Airsense 11 Pressures were set at 5-15 cm H2O.   Patient s ramp is 5 cm H2O for Auto and FLEX/EPR is EPR, 2.  Patient received a Resmed Mask name: P10  Pillow mask size Standard, heated tubing and heated humidifier.  Patient does not need to meet compliance. .    Ely Page

## 2022-06-02 NOTE — NURSING NOTE
Writer has pended a [employer] message for patient. The message states to follow up up after receiving their CPAP Device. It will be sent out on 7/1/2022.

## 2022-06-03 ENCOUNTER — DOCUMENTATION ONLY (OUTPATIENT)
Dept: SLEEP MEDICINE | Facility: CLINIC | Age: 37
End: 2022-06-03
Payer: COMMERCIAL

## 2022-06-03 DIAGNOSIS — G47.33 OSA (OBSTRUCTIVE SLEEP APNEA): Primary | ICD-10-CM

## 2022-06-03 NOTE — PROGRESS NOTES
3 day Sleep therapy management telephone visit    Diagnostic AHI:  42.3 HST    Confirmed with patient at time of call- N/A Patient is still interested in STM service       Message left for patient to return call        Objective data     Order Settings for PAP  CPAP min 5    CPAP max 15             Device settings from machine CPAP min 5.0     CPAP max 15.0           EPR Setting TWO    RESMED soft response  OFF     Assessment: Minimal usage      Action plan: Patient to have 14 day STM visit. Patient has a follow up visit scheduled:   no    Replacement device: No  STM ordered by provider: Yes     Total time spent on accessing and  interpreting remote patient PAP therapy data  10 minutes    Total time spent counseling, coaching  and reviewing PAP therapy data with patient  1 minutes    [id number] no

## 2022-06-07 ENCOUNTER — DOCUMENTATION ONLY (OUTPATIENT)
Dept: SLEEP MEDICINE | Facility: CLINIC | Age: 37
End: 2022-06-07
Payer: COMMERCIAL

## 2022-06-07 DIAGNOSIS — G47.33 OSA (OBSTRUCTIVE SLEEP APNEA): Primary | ICD-10-CM

## 2022-06-07 NOTE — PROGRESS NOTES
[facility] Recheck: Patient stated he can't wear his mask and is looking to get a different mask. Patient will reach out to [facility]..

## 2022-06-10 ENCOUNTER — DOCUMENTATION ONLY (OUTPATIENT)
Dept: SLEEP MEDICINE | Facility: CLINIC | Age: 37
End: 2022-06-10
Payer: COMMERCIAL

## 2022-06-10 DIAGNOSIS — G47.33 OSA (OBSTRUCTIVE SLEEP APNEA): Primary | ICD-10-CM

## 2022-06-10 NOTE — PROGRESS NOTES
[facility] on Brooke 10, 2022. Patient requested to switch masks because the Airfit P10 he received at setup in May does not stay in place, he is also breathing through his mouth. Discussed the following masks: Airfit F20 medium, Airtouch F20 medium, Airfit F30i medium. Patient selected a Resmed Mask name: Airtouch F20 Full Face mask size Medium

## 2022-10-03 ENCOUNTER — HEALTH MAINTENANCE LETTER (OUTPATIENT)
Age: 37
End: 2022-10-03

## 2022-11-21 ENCOUNTER — DOCUMENTATION ONLY (OUTPATIENT)
Dept: SLEEP MEDICINE | Facility: CLINIC | Age: 37
End: 2022-11-21
Payer: COMMERCIAL

## 2022-11-21 DIAGNOSIS — G47.33 OSA (OBSTRUCTIVE SLEEP APNEA): Primary | ICD-10-CM

## 2022-11-21 NOTE — PROGRESS NOTES
STM Recheck:  Patient requesting his pressures to be lowered he wants to make CPAP work for him.  Will put in for a pressure change 5-12 cm H20.

## 2022-12-14 ENCOUNTER — DOCUMENTATION ONLY (OUTPATIENT)
Dept: SLEEP MEDICINE | Facility: CLINIC | Age: 37
End: 2022-12-14
Payer: COMMERCIAL

## 2022-12-14 DIAGNOSIS — G47.33 OSA (OBSTRUCTIVE SLEEP APNEA): Primary | ICD-10-CM

## 2022-12-14 NOTE — PROGRESS NOTES
STM recheck: Patient still waking up pulling off his mask.  He wants to have a fixed pressure. Will put in for a pressure change.

## 2023-01-20 ENCOUNTER — DOCUMENTATION ONLY (OUTPATIENT)
Dept: SLEEP MEDICINE | Facility: CLINIC | Age: 38
End: 2023-01-20
Payer: COMMERCIAL

## 2023-01-20 DIAGNOSIS — G47.33 OSA (OBSTRUCTIVE SLEEP APNEA): Primary | ICD-10-CM

## 2023-01-20 NOTE — PROGRESS NOTES
STM Recheck:  Patient still pulling his mask off without knowing it.  Patient requested to go back to his old pressures.

## 2023-02-11 ENCOUNTER — HEALTH MAINTENANCE LETTER (OUTPATIENT)
Age: 38
End: 2023-02-11

## 2023-02-28 ENCOUNTER — DOCUMENTATION ONLY (OUTPATIENT)
Dept: SLEEP MEDICINE | Facility: CLINIC | Age: 38
End: 2023-02-28
Payer: COMMERCIAL

## 2023-02-28 DIAGNOSIS — G47.33 OSA (OBSTRUCTIVE SLEEP APNEA): Primary | ICD-10-CM

## 2023-02-28 NOTE — Clinical Note
Patient waking up to high CPAP pressures that he can't tolerate.  Request pressure change 5-12 cm H20. Thanks

## 2023-11-01 NOTE — Clinical Note
Favor Dr Hogan patient who is out this week request pressure change 5-12 cm H20 in Resmed. Thanks my friend for life :) ouch

## 2024-03-23 ENCOUNTER — HEALTH MAINTENANCE LETTER (OUTPATIENT)
Age: 39
End: 2024-03-23

## 2025-04-12 ENCOUNTER — HEALTH MAINTENANCE LETTER (OUTPATIENT)
Age: 40
End: 2025-04-12